# Patient Record
Sex: MALE | Race: WHITE | Employment: FULL TIME | URBAN - NONMETROPOLITAN AREA
[De-identification: names, ages, dates, MRNs, and addresses within clinical notes are randomized per-mention and may not be internally consistent; named-entity substitution may affect disease eponyms.]

---

## 2018-05-01 ENCOUNTER — HOSPITAL ENCOUNTER (OUTPATIENT)
Dept: NON INVASIVE DIAGNOSTICS | Age: 38
Discharge: HOME | End: 2018-05-01
Payer: COMMERCIAL

## 2018-05-01 PROCEDURE — 93017 CV STRESS TEST TRACING ONLY: CPT

## 2024-01-24 ENCOUNTER — HOSPITAL ENCOUNTER (OUTPATIENT)
Dept: SLEEP CENTER | Age: 44
Discharge: HOME | End: 2024-01-24
Payer: COMMERCIAL

## 2024-01-24 VITALS — BODY MASS INDEX: 36.51 KG/M2 | WEIGHT: 255 LBS | HEIGHT: 70 IN

## 2024-01-24 PROCEDURE — 95811 POLYSOM 6/>YRS CPAP 4/> PARM: CPT

## 2024-01-24 ASSESSMENT — SLEEP AND FATIGUE QUESTIONNAIRES
USUAL AMOUNT OF TIME TO FALL ASLEEP (MIN): 60
HAS ANYONE NOTICED THAT YOU QUIT BREATHING DURING SLEEP: ALMOST DAILY
HOW LIKELY ARE YOU TO NOD OFF OR FALL ASLEEP WHILE SITTING QUIETLY AFTER LUNCH WITHOUT ALCOHOL: 3
HOW LIKELY ARE YOU TO NOD OFF OR FALL ASLEEP WHILE LYING DOWN TO REST IN THE AFTERNOON WHEN CIRCUMSTANCES PERMIT: 3
NORMAL AMOUNT OF SLEEP PER NIGHT: 5
ESS TOTAL SCORE: 17
DO YOU SNORE: YES
HOW LIKELY ARE YOU TO NOD OFF OR FALL ASLEEP WHILE WATCHING TV: 2
ARE YOU TIRED AFTER SLEEPING: ALMOST DAILY
DOES YOUR SNORING BOTHER OTHERS: YES
HOW MANY NAPS DO YOU TAKE PER WEEK: 5
NUMBER OF TIMES YOU WAKE PER NIGHT: 3
SNORING VOLUME: AS LOUD AS TALKING
HOW LIKELY ARE YOU TO NOD OFF OR FALL ASLEEP WHILE SITTING AND TALKING TO SOMEONE: 1
ARE YOU TIRED DURING WAKE TIME: ALMOST DAILY
HOW LIKELY ARE YOU TO NOD OFF OR FALL ASLEEP WHILE SITTING AND READING: 3
HOW LIKELY ARE YOU TO NOD OFF OR FALL ASLEEP WHEN YOU ARE A PASSENGER IN A CAR FOR AN HOUR WITHOUT A BREAK: 3
HAVE YOU EVER NODDED OFF OR FALLEN ASLEEP WHILE DRIVING: NO
WHAT TIME DO YOU USUALLY GO TO BED: 82800
WHAT TIME DO YOU USUALLY WAKE UP: 25200
FUNCTION BEST IN: EVENING
HOW LIKELY ARE YOU TO NOD OFF OR FALL ASLEEP WHILE SITTING INACTIVE IN A PUBLIC PLACE: 2
HOW LIKELY ARE YOU TO NOD OFF OR FALL ASLEEP IN A CAR, WHILE STOPPED FOR A FEW MINUTES IN TRAFFIC: 0
HOW OFTEN DO YOU SNORE: ALMOST DAILY
DO YOU HAVE HIGH BLOOD PRESSURE: YES

## 2024-01-25 LAB — STATUS: NORMAL

## 2024-01-25 NOTE — PROGRESS NOTES
Patient arrived for sleep testing, Testing was explained, all questions answered. Patient voiced understanding.     Patient was spilt for severe apnea and low SpO2.   Patient fitted with  Dreamwear nasal pillows size small.   [facility] is Medical service company in [place]